# Patient Record
Sex: FEMALE | Race: WHITE | NOT HISPANIC OR LATINO | URBAN - NONMETROPOLITAN AREA
[De-identification: names, ages, dates, MRNs, and addresses within clinical notes are randomized per-mention and may not be internally consistent; named-entity substitution may affect disease eponyms.]

---

## 2021-04-15 ENCOUNTER — IMPORTED ENCOUNTER (OUTPATIENT)
Dept: URBAN - NONMETROPOLITAN AREA CLINIC 1 | Facility: CLINIC | Age: 31
End: 2021-04-15

## 2021-04-15 PROBLEM — H52.223: Noted: 2021-04-15

## 2021-04-15 PROBLEM — H52.13: Noted: 2021-04-15

## 2021-04-15 PROCEDURE — S0620 ROUTINE OPHTHALMOLOGICAL EXA: HCPCS

## 2021-04-15 NOTE — PATIENT DISCUSSION
Compound Myopic Astigmatism OU -  discussed findings w/patient-  new spectacle Rx issued-  continue to monitor yearly or prn; 's Notes: MR 4/15/2021DFE 4/15/2021

## 2022-04-09 ASSESSMENT — VISUAL ACUITY
OS_SC: 20/40
OD_SC: 20/30-2
OD_PH: 20/30+2

## 2022-04-09 ASSESSMENT — TONOMETRY
OD_IOP_MMHG: 16
OS_IOP_MMHG: 16